# Patient Record
Sex: MALE | Race: WHITE
[De-identification: names, ages, dates, MRNs, and addresses within clinical notes are randomized per-mention and may not be internally consistent; named-entity substitution may affect disease eponyms.]

---

## 2017-05-10 ENCOUNTER — HOSPITAL ENCOUNTER (EMERGENCY)
Dept: HOSPITAL 80 - FED | Age: 67
Discharge: HOME | End: 2017-05-10
Payer: COMMERCIAL

## 2017-05-10 VITALS
DIASTOLIC BLOOD PRESSURE: 80 MMHG | OXYGEN SATURATION: 95 % | SYSTOLIC BLOOD PRESSURE: 140 MMHG | HEART RATE: 58 BPM | RESPIRATION RATE: 18 BRPM | TEMPERATURE: 98.8 F

## 2017-05-10 DIAGNOSIS — Z85.47: ICD-10-CM

## 2017-05-10 DIAGNOSIS — I10: ICD-10-CM

## 2017-05-10 DIAGNOSIS — R42: Primary | ICD-10-CM

## 2017-05-10 LAB
% IMMATURE GRANULYOCYTES: 0.5 % (ref 0–1.1)
ABSOLUTE IMMATURE GRANULOCYTES: 0.06 10^3/UL (ref 0–0.1)
ABSOLUTE NRBC COUNT: 0 10^3/UL (ref 0–0.01)
ADD DIFF?: NO
ADD MORPH?: NO
ADD SCAN?: NO
ANION GAP SERPL CALC-SCNC: 11 MEQ/L (ref 8–16)
ATYPICAL LYMPHOCYTE FLAG: 0 (ref 0–99)
CALCIUM SERPL-MCNC: 10.2 MG/DL (ref 8.5–10.4)
CHLORIDE SERPL-SCNC: 104 MEQ/L (ref 97–110)
CO2 SERPL-SCNC: 22 MEQ/L (ref 22–31)
CREAT SERPL-MCNC: 0.7 MG/DL (ref 0.7–1.3)
ERYTHROCYTE [DISTWIDTH] IN BLOOD BY AUTOMATED COUNT: 11.9 % (ref 11.5–15.2)
FRAGMENT RBC FLAG: 0 (ref 0–99)
GFR SERPL CREATININE-BSD FRML MDRD: > 60 ML/MIN/{1.73_M2}
GLUCOSE SERPL-MCNC: 92 MG/DL (ref 70–100)
HCT VFR BLD CALC: 46.7 % (ref 40–51)
HGB BLD-MCNC: 16.4 G/DL (ref 13.7–17.5)
LEFT SHIFT FLG: 0 (ref 0–99)
LIPEMIA HEMOLYSIS FLAG: 90 (ref 0–99)
MCH RBC BLDCO QN: 32.3 PG (ref 27.9–34.1)
MCHC RBC AUTO-ENTMCNC: 35.1 G/DL (ref 32.4–36.7)
MCV RBC AUTO: 91.9 FL (ref 81.5–99.8)
NRBC-AUTO%: 0 % (ref 0–0.2)
PLATELET # BLD: 256 10^3/UL (ref 150–400)
PLATELET CLUMPS FLAG: 0 (ref 0–99)
PMV BLD AUTO: 10.6 FL (ref 8.7–11.7)
POTASSIUM SERPL-SCNC: 4.8 MEQ/L (ref 3.5–5.2)
RBC # BLD AUTO: 5.08 10^6/UL (ref 4.4–6.38)
SODIUM SERPL-SCNC: 137 MEQ/L (ref 134–144)
TROPONIN I SERPL-MCNC: < 0.012 NG/ML (ref 0–0.03)

## 2017-05-10 PROCEDURE — 93005 ELECTROCARDIOGRAM TRACING: CPT

## 2017-05-10 PROCEDURE — 99284 EMERGENCY DEPT VISIT MOD MDM: CPT

## 2017-05-10 PROCEDURE — 96374 THER/PROPH/DIAG INJ IV PUSH: CPT

## 2017-05-10 NOTE — CPEKG
Heart Rate: 56

RR Interval: 1071

P-R Interval: 160

QRSD Interval: 82

QT Interval: 456

QTC Interval: 441

P Axis: 12

QRS Axis: -12

T Wave Axis: 21

EKG Severity - NORMAL ECG -

EKG Impression: SINUS RHYTHM

Electronically Signed By: Ana Pascal 10-May-2017 22:13:14

## 2017-05-10 NOTE — EDPHY
H & P


Time Seen by Provider: 05/10/17 16:49


HPI/ROS: 


CHIEF COMPLAINT: Dizziness





HISTORY OF PRESENT ILLNESS: This patient is a 67 year old male who presents to 

the Emergency Department via EMS for complaints of sudden onset room-spinning 

dizziness with associated nausea beginning at 1430 today as he was walking up a 

flight of stairs. He reports that he experienced the room "oscillate 180 degrees

" followed by acute nausea. He laid down on the floor, effectively alleviating 

the dizzy sensation. Upon arrival, he reports that his dizziness has subsided 

completely, but his nausea has persisted to the present. He also describes a 

sensation of generalized weakness, like he has been "washed out." He reports a 

mild headache localized to the center of his forehead. He denies numbness, 

tingling, focal weakness, or visual changes. No bloody or dark colored stools. 

He has a remote history of one episode of atrial fibrillation; no additional 

cardiac history.





REVIEW OF SYSTEMS:


Constitutional: No fever, no chills


Eyes: No visual changes


ENT: No sore throat


Respiratory: No cough, no shortness of breath


Cardiac: No chest pain


Gastrointestinal: +nausea, no vomiting, no abdominal pain


Genitourinary: No hematuria, no dysuria


Musculoskeletal: No leg pain or swelling


Skin: No rash


Neurological: +dizziness now subsided, +frontal headache, no numbness, no focal 

weakness


Psychiatric: No depression





Past Medical/Surgical History: 


Hypertension (metoprolol), recurrent UTIs, remote history of testicular cancer, 

one prior episode of atrial fibrillation.


Social History: 


, wife at bedside


Physical Exam: 


General Appearance: Alert, no distress


Eyes: Pupils equal and round, no conjunctival pallor or injection, no nystagmus


ENT, Mouth: Mild hearing loss bilaterally at baseline, mucous membranes moist


Neck: Normal inspection


Respiratory: Lungs are clear to auscultation


Cardiovascular: Regular rate and rhythm 


Gastrointestinal:  Abdomen is soft and non- tender 


Neurological:  Alert, oriented x3, cranial nerves II through XII intact, motor 5

/5, sensory intact to light touch


Skin:  Warm and dry, no rash


Extremities:  Nontender, no pedal edema


Psychiatric: Mood and affect normal


Constitutional: 


 Initial Vital Signs











Temperature (C)  36.6 C   05/10/17 17:01


 


Heart Rate  56 L  05/10/17 17:01


 


Respiratory Rate  14   05/10/17 17:01


 


Blood Pressure  177/80 H  05/10/17 17:01


 


O2 Sat (%)  100   05/10/17 17:01








 











O2 Delivery Mode               Room Air














Allergies/Adverse Reactions: 


 





oxycodone Allergy (Verified 05/10/17 17:00)


 








Home Medications: 














 Medication  Instructions  Recorded


 


Flomax  05/10/17


 


Gabapentin  05/10/17


 


Marijuana  05/10/17


 


Meclizine HCl [Meclizine HCl 25 mg 25 mg PO TID PRN #15 tab 05/10/17





(RX,OTC)]  


 


Metoprolol Succinate  05/10/17


 


Ondansetron Odt [Zofran Odt 4 mg 4 mg PO Q4 PRN #6 tab 05/10/17





(*)]  














Medical Decision Making





- Diagnostics


EKG Interpretation: 


EKG interpreted by me reveals normal sinus rhythm, rate 56, no ST/T changes.  

Interpretation: normal EKG





ED Course/Re-evaluation: 


1655: Took EMS report at bedside. Vitals in transport: HR 70, /106. PACs 

on monitor.





This 67-year-old male presents via EMS following multiple episodes of room-

spinning dizziness with associated nausea beginning acutely at 1430 this 

afternoon. He describes each episode as exacerbated with movement of his head 

or when attempting to stand or walk, suggestive of vertigo.  No concerning 

signs or symptoms suggestive of a central etiology for his vertigo.  Neurologic 

exam is normal.  He has a mild headache at time of presentation. His exam is 

benign. He has no nystagmus, no focal weakness, motor and sensation in tact 

throughout. Will proceed with labs and EKG. The patient will be placed on a 

cardiac monitor for observation.





IV established. 1L IV NS and 4mg IV Zofran administered.





Labs reviewed and are unremarkable. Troponin is negative. EKG is normal. The 

patient has remained in sinus rhythm on cardiac monitor for the duration of his 

visit. He was able to walk around the department without recurrence of vertigo. 

He will be discharged home in good condition with instructions to follow-up 

with her PCP to discuss long-term treatment for vertigo.


Differential Diagnosis: 


The differential diagnosis for the patient's dizziness included but was not 

limited to peripheral and central causes of vertigo, orthostatic causes 

including dehydration, cardiogenic and neurogenic causes, and blood loss.





- Data Points


Laboratory Results: 


 Laboratory Results





 05/10/17 16:45 





 05/10/17 16:45 





 











  05/10/17 05/10/17





  16:45 16:45


 


WBC    12.85 10^3/uL H 10^3/uL





    (3.80-9.50) 


 


RBC    5.08 10^6/uL 10^6/uL





    (4.40-6.38) 


 


Hgb    16.4 g/dL g/dL





    (13.7-17.5) 


 


Hct    46.7 % %





    (40.0-51.0) 


 


MCV    91.9 fL fL





    (81.5-99.8) 


 


MCH    32.3 pg pg





    (27.9-34.1) 


 


MCHC    35.1 g/dL g/dL





    (32.4-36.7) 


 


RDW    11.9 % %





    (11.5-15.2) 


 


Plt Count    256 10^3/uL 10^3/uL





    (150-400) 


 


MPV    10.6 fL fL





    (8.7-11.7) 


 


Neut % (Auto)    74.5 % H %





    (39.3-74.2) 


 


Lymph % (Auto)    15.7 % %





    (15.0-45.0) 


 


Mono % (Auto)    7.0 % %





    (4.5-13.0) 


 


Eos % (Auto)    1.6 % %





    (0.6-7.6) 


 


Baso % (Auto)    0.7 % %





    (0.3-1.7) 


 


Nucleat RBC Rel Count    0.0 % %





    (0.0-0.2) 


 


Absolute Neuts (auto)    9.58 10^3/uL H 10^3/uL





    (1.70-6.50) 


 


Absolute Lymphs (auto)    2.02 10^3/uL 10^3/uL





    (1.00-3.00) 


 


Absolute Monos (auto)    0.90 10^3/uL H 10^3/uL





    (0.30-0.80) 


 


Absolute Eos (auto)    0.20 10^3/uL 10^3/uL





    (0.03-0.40) 


 


Absolute Basos (auto)    0.09 10^3/uL 10^3/uL





    (0.02-0.10) 


 


Absolute Nucleated RBC    0.00 10^3/uL 10^3/uL





    (0-0.01) 


 


Immature Gran %    0.5 % %





    (0.0-1.1) 


 


Immature Gran #    0.06 10^3/uL 10^3/uL





    (0.00-0.10) 


 


Sodium  137 mEq/L mEq/L  





   (134-144)  


 


Potassium  4.8 mEq/L mEq/L  





   (3.5-5.2)  


 


Chloride  104 mEq/L mEq/L  





   ()  


 


Carbon Dioxide  22 mEq/l mEq/l  





   (22-31)  


 


Anion Gap  11 mEq/L mEq/L  





   (8-16)  


 


BUN  14 mg/dL mg/dL  





   (7-23)  


 


Creatinine  0.7 mg/dL mg/dL  





   (0.7-1.3)  


 


Estimated GFR  > 60   





   


 


Glucose  92 mg/dL mg/dL  





   ()  


 


Calcium  10.2 mg/dL mg/dL  





   (8.5-10.4)  


 


Troponin I  < 0.012 ng/mL ng/mL  





   (0-0.034)  











Medications Given: 


 








Discontinued Medications





Sodium Chloride (Ns)  500 mls @ 0 mls/hr IV ONCE ONE


   PRN Reason: As Directed


   Stop: 05/10/17 16:59


   Last Admin: 05/10/17 17:14 Dose:  500 mls








Departure





- Departure


Disposition: Home, Routine, Self-Care


Clinical Impression: 


 Vertigo





Condition: Good


Instructions:  Vertigo (ED)


Additional Instructions: 


1. Take 25mg Meclizine every 6 hours as needed to treat episodes of vertigo (

room-spinning dizziness).





2. Follow-up with your primary care provider for reevaluation within 3-5 days 

and to discuss long-term treatment for vertigo.





3. Return to the Emergency Department if you experience episodes of fainting, 

heart palpitations, uncontrollable dizziness or vomiting, severe headache, 

numbness or weakness, or for other serious concerns.


Referrals: 


Patient,NotPresent [Unknown] - As per Instructions


Prescriptions: 


Meclizine HCl [Meclizine HCl 25 mg (RX,OTC)] 25 mg PO TID PRN #15 tab


 PRN Reason: Dizziness


Ondansetron Odt [Zofran Odt 4 mg (*)] 4 mg PO Q4 PRN #6 tab


 PRN Reason: Nausea


Report Scribed for: Ana Pascal


Report Scribed by: Bhavana Jacinto


Date of Report: 05/10/17


Time of Report: 16:49


Physician Review and Approval Statement: 





05/10/17 16:49


Portions of this note were transcribed by a medical scribe.  I personally 

performed a history, physical exam, medical decision making, and confirmed 

accuracy of information the transcribed note.

## 2017-05-17 ENCOUNTER — HOSPITAL ENCOUNTER (OUTPATIENT)
Dept: HOSPITAL 80 - BMCIMAGING | Age: 67
End: 2017-05-17
Attending: NURSE PRACTITIONER
Payer: COMMERCIAL

## 2017-05-17 DIAGNOSIS — R42: ICD-10-CM

## 2017-05-17 DIAGNOSIS — I70.8: ICD-10-CM

## 2017-05-17 DIAGNOSIS — R55: Primary | ICD-10-CM

## 2017-05-17 DIAGNOSIS — I10: ICD-10-CM

## 2017-06-02 ENCOUNTER — HOSPITAL ENCOUNTER (OUTPATIENT)
Dept: HOSPITAL 80 - FIMAGING | Age: 67
End: 2017-06-02
Attending: PSYCHIATRY & NEUROLOGY
Payer: COMMERCIAL

## 2017-06-02 DIAGNOSIS — R42: ICD-10-CM

## 2017-06-02 DIAGNOSIS — R29.90: Primary | ICD-10-CM

## 2017-06-02 PROCEDURE — 70498 CT ANGIOGRAPHY NECK: CPT

## 2019-01-17 ENCOUNTER — HOSPITAL ENCOUNTER (EMERGENCY)
Dept: HOSPITAL 80 - FED | Age: 69
Discharge: HOME | End: 2019-01-17
Payer: COMMERCIAL

## 2019-01-17 VITALS — DIASTOLIC BLOOD PRESSURE: 72 MMHG | SYSTOLIC BLOOD PRESSURE: 143 MMHG
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
## 2019-01-17 DIAGNOSIS — S30.1XXA: Primary | ICD-10-CM

## 2019-01-17 DIAGNOSIS — K42.9: ICD-10-CM

## 2019-01-17 DIAGNOSIS — E86.9: ICD-10-CM

## 2019-01-17 PROCEDURE — 99285 EMERGENCY DEPT VISIT HI MDM: CPT

## 2019-01-17 PROCEDURE — 96360 HYDRATION IV INFUSION INIT: CPT

## 2019-01-17 PROCEDURE — 74177 CT ABD & PELVIS W/CONTRAST: CPT

## 2019-01-17 NOTE — EDPHY
General


Time Seen by Provider: 01/17/19 14:53


Narrative: 





CLINICAL IMPRESSION: 


Abdominal wall contusion, periumbilical hernia 


_________________


ASSESSMENT/PLAN:


Very pleasant 68-year-old male presents to the emergency department with 

abdominal wall pain after he was hit by a piece of wood he was cutting with a 

table saw when it kicked back at him.  See HPI for full details.  Patient also 

sustained a small laceration to the left index finger.  He has applied skin 

glue to his finger and this does not appear to require sutures.  Tetanus 

reported as up-to-date He has an intact distal neurovascular exam to the left 

hand.  He has a notable 12 in superficial abrasion to the upper abdomen.  No 

associated distention, rigidity, Levar sign. CT abdomen and pelvis with 

contrast shows no solid organ injury, hematoma, laceration.  He has a 5.3 cm 

umbilical hernia without evidence of incarceration and reports that he had this 

prior to the trauma.  I encouraged him to follow up with primary care in 

consider General surgery evaluation for his hernia.  Close monitoring of 

symptoms at home, warning signs return to ED sooner outlined and discharge. 


_________________


DIFFERENTIAL DX:


 Differential includes but not limited to solid organ injury, abdominal wall 

contusion, finger laceration, neurovascular injury.


_________________


ED PROCEDURES:


See lab and/or imaging results below  


_________________


ED COURSE:


4:10 p.m.:  Case discussed with Dr. Del Rosario from Radiology.  Patient has a 5.3 cm 

umbilical/periumbilical hernia with no evidence of incarceration.  No solid 

organ hematoma or splenic laceration identified.  Postoperative changes noted.


_________________


CHIEF COMPLAINT:  Abdominal pain, left index finger laceration


_________________


HPI:


This is a pleasant 68-year-old male presents to the emergency department with 

abdominal pain and a left finger laceration.  Patient is a sculptor, was 

cutting a large piece of wood with a table saw when the wood kicked back, hit 

him forcefully in the abdomen and then reportedly flu"10-12 feet in the air 

before shattering against a wall".  Patient was knocked to the ground by the 

impact.  He also has a small cut to the left index finger.  He went to Urgent 

Care was sent here.  He has had numerous prior abdominal surgeries.  He is 

complaining primarily of upper abdominal pain.  He feels mildly nauseous but 

has not vomited.  He is not anticoagulated.  He reports his tetanus is up-to-

date.


_________________


PAST MEDICAL HISTORY: 


Hypertension, atrial fibrillation, testicular cancer, colon cancer 


See nurse/triage notes for additional history if applicable 





Pertinent Past Surgical History:  Retroperitoneal node dissection and prior 

colon surgery





Family History:  Noncontributory





Social History:  Tetanus reported up-to-date, , here with his wife


_________________


REVIEW OF SYSTEMS:


All other systems negative


Constitutional:  No fever, no chills, appetite change.


Cardiovascular:  No chest pain, no palpitations.


Respiratory:  No cough, no shortness of breath.


Gastrointestinal: Positive for abdominal pain, no vomiting, diarrhea.


Genitourinary:  No hematuria, dysuria, flank pain, pelvic pain


Musculoskeletal:  No back pain, joint swelling, joint pain, myalgias.


Skin:  No rashes, color change, positive for laceration


Neurological:  No headache, dizziness, weakness.





_________________


PHYSICAL EXAM:


General Appearance:  Alert, oriented, appropriate, cooperative, NAD, well 

hydrated, non-toxic appearing, VSS, no hypoxia.


Respiratory:  There are no retractions, lungs are clear to auscultation.


Cardiac:  Regular rate and rhythm, no murmurs or gallops.


Gastrointestinal: Abdomen is soft, superficial abrasion noted to upper abdomen 

measuring approximately 12 in horizontally.  Patient is tender to palpation 

left upper quadrant more so than right upper quadrant.  No obvious distention 

or rigidity.  Negative levar Sign.  bowel sounds normal, small ventral wall or 

umbilical hernia noted which patient states is chronic, no guarding or focal 

peritoneal findings.


Neurological: [ Alert and oriented x 3, CN 2-12 grossly intact


Skin: Warm, dry, no rashes, no nodules on palpation.  See above


Musculoskeletal:  Extremities are symmetrical, full range of motion, no 

tenderness, deformity, swelling, or erythema.  Left index finger with a small 

flap laceration on the medial proximal MCP joint.  Full range of motion, distal 

neurovascular exam intact.  No sutures indicated.  Patient has applied skin 

glue and a Band-Aid.


Psychiatric:  Patient is oriented X 3, there is no agitation.





_________________


MEDICAL DECISION MAKING:


Patient was seen independently. Secondary supervising physician at time of 

evaluation was Dr. McCollester .


Diagnosis: Abdominal wall contusion, periumbilical hernia . New, requires workup


Summary:  See Assessment and Plan for summary of ED visit 


Clinical lab tests:  ordered / reviewed.


Independent visualization of images, tracing, or specimens:  Yes.





Patient Progress:  Stable. 





- History


Smoking Status: Never smoked





- Objective


Vital Signs: 


 Initial Vital Signs











Temperature (C)  37.0 C   01/17/19 14:22


 


Heart Rate  64   01/17/19 14:22


 


Respiratory Rate  18   01/17/19 14:22


 


Blood Pressure  157/85 H  01/17/19 14:22


 


O2 Sat (%)  97   01/17/19 14:22








 











O2 Delivery Mode               Room Air














Allergies/Adverse Reactions: 


 





oxycodone Allergy (Verified 01/17/19 14:26)


 








Home Medications: 














 Medication  Instructions  Recorded


 


Flomax  05/10/17


 


Gabapentin  05/10/17


 


Marijuana  05/10/17


 


Meclizine HCl [Meclizine HCl 25 mg 25 mg PO TID PRN #15 tab 05/10/17





(RX,OTC)]  


 


Metoprolol Succinate  05/10/17


 


Ondansetron Odt [Zofran Odt 4 mg 4 mg PO Q4 PRN #6 tab 05/10/17





(*)]  











Laboratory Results: 


 











  01/17/19





  15:26


 


POC Hgb  16.0 gm/dL gm/dL





   (13.7-17.5) 


 


POC Hct  47 % %





   (40-51) 


 


POC Sodium  143 mEq/L mEq/L





   (135-145) 


 


POC Potassium  4.2 mEq/L mEq/L





   (3.3-5.0) 


 


POC Chloride  104 mEq/L mEq/L





   () 


 


POC BUN  15 mg/dL mg/dL





   (7-23) 


 


POC Creatinine  0.8 mg/dL mg/dL





   (0.7-1.3) 


 


POC Glucose  83 mg/dL mg/dL





   () 











Medications Given: 


 








Discontinued Medications





Sodium Chloride (Ns)  1,000 mls @ 0 mls/hr IV EDNOW ONE; Wide Open


   PRN Reason: Protocol


   Stop: 01/17/19 15:16


   Last Admin: 01/17/19 15:26 Dose:  1,000 mls





Point of Care Test Results: 


 Chemistry











  01/17/19





  15:26


 


POC Sodium  143 mEq/L mEq/L





   (135-145) 


 


POC Potassium  4.2 mEq/L mEq/L





   (3.3-5.0) 


 


POC Chloride  104 mEq/L mEq/L





   () 


 


POC BUN  15 mg/dL mg/dL





   (7-23) 


 


POC Creatinine  0.8 mg/dL mg/dL





   (0.7-1.3) 


 


POC Glucose  83 mg/dL mg/dL





   () 








 ISTAT H&H











  01/17/19





  15:26


 


POC Hgb  16.0 gm/dL gm/dL





   (13.7-17.5) 


 


POC Hct  47 % %





   (40-51) 














Departure





- Departure


Disposition: Home, Routine, Self-Care


Clinical Impression: 


 Periumbilical hernia





Abdominal wall contusion


Qualifiers:


 Encounter type: initial encounter Qualified Code(s): S30.1XXA - Contusion of 

abdominal wall, initial encounter





Condition: Good


Instructions:  Umbilical Hernia (ED)


Additional Instructions: 


DISCHARGE INSTRUCTIONS FROM YOUR DOCTOR 


Thank you for visiting our emergency department today. Please keep in mind that 

discharge from the emergency department does not mean that there is nothing 

wrong - it simply means that we have not identified an emergency condition that 

requires further evaluation or treatment in the hospital. You should always 

plan to follow up with primary care for re-evaluation of your condition in the 

next 2-3 days. If you have been referred to a specialist, please call as soon 

as possible (today or tomorrow) to schedule your follow up appointment at the 

appropriate time. 





 CT SCAN OF YOUR ABDOMEN SHOWED A 5.3 CM UMBILICAL HERNIA.  YOU OTHERWISE HAVE 

NO SOLID ORGAN INJURY, HEMATOMA OR LACERATION.  MONITOR SYMPTOMS CLOSELY.  

PLEASE FOLLOW-UP WITH PRIMARY CARE.  YOUR HERNIA IS NOT INCARCERATED AND DOES 

NOT REQUIRE EMERGENT SURGERY HOWEVER IF THIS BOTHERS YOU YOU MAY WANT TO 

CONSIDER GENERAL SURGERY FOLLOW-UP.  A REFERRAL WAS GIVEN.  RETURN TO THE 

EMERGENCY DEPARTMENT FOR WORSENING ABDOMINAL PAIN, DISTENTION, RIGIDITY, FEVERS

, NAUSEA VOMITING, DIFFICULTY HAVING BOWEL MOVEMENTS, OR ANY OTHER CONCERNS.





People present with illnesses and injuries in different ways, and it is always 

possible that we have missed something. You may always return for re-evaluation 

if symptoms worsen or if they are not improving or if you develop new/different 

symptoms. 


Again, thank you for choosing our emergency department. We hope that you feel 

better.


Referrals: 


Marino Virgen MD [Medical Doctor] - 3-4 days, if not improved


Ludivina Segovia MD [Primary Care Provider] - 1-2 days without fail